# Patient Record
Sex: FEMALE | Race: BLACK OR AFRICAN AMERICAN | NOT HISPANIC OR LATINO | URBAN - METROPOLITAN AREA
[De-identification: names, ages, dates, MRNs, and addresses within clinical notes are randomized per-mention and may not be internally consistent; named-entity substitution may affect disease eponyms.]

---

## 2024-06-28 ENCOUNTER — OFFICE (OUTPATIENT)
Dept: URBAN - METROPOLITAN AREA CLINIC 11 | Facility: CLINIC | Age: 74
End: 2024-06-28

## 2024-06-28 VITALS
SYSTOLIC BLOOD PRESSURE: 164 MMHG | DIASTOLIC BLOOD PRESSURE: 94 MMHG | HEART RATE: 88 BPM | HEIGHT: 63 IN | WEIGHT: 171 LBS | OXYGEN SATURATION: 97 % | SYSTOLIC BLOOD PRESSURE: 146 MMHG | DIASTOLIC BLOOD PRESSURE: 88 MMHG

## 2024-06-28 DIAGNOSIS — Z12.11 ENCOUNTER FOR SCREENING FOR MALIGNANT NEOPLASM OF COLON: ICD-10-CM

## 2024-06-28 RX ORDER — SODIUM PICOSULFATE, MAGNESIUM OXIDE, AND ANHYDROUS CITRIC ACID 12; 3.5; 1 G/175ML; G/175ML; MG/175ML
LIQUID ORAL
Qty: 1 | Refills: 0 | Status: COMPLETED
Start: 2024-06-28 | End: 2024-08-22

## 2024-06-28 NOTE — SERVICENOTES
She reports that she did complete Cologuard testing, but that her daughter felt that she still needed to have a colonoscopy for a more complete evaluation.

## 2024-06-28 NOTE — SERVICEHPINOTES
Ms. Roman presents today with referral from Dr. Randolph for screening colonoscopy.    Patient states she has never had a colonoscopy.  Patient reports she did a Cologuard about a month ago which was negative.  She reports having a firm daily bowel movement.  She reports if she does not eat properly or enough fiber in her diet she may get constipated but still states she has a daily bowel movement.  She denies any nausea, vomiting, diarrhea, abdominal pain, hematochezia, or melena.  
br
br Patient denies any GERD symptoms or dysphagia.  Patient reports she is not on any blood thinners, GLP medications, or followed by cardiologist.  
br
br She denies any family history of colon cancer and uncertain of any colon polyps.

## 2024-08-22 PROBLEM — K63.5 POLYP OF COLON: Status: ACTIVE | Noted: 2024-08-22
